# Patient Record
Sex: FEMALE | Race: WHITE | NOT HISPANIC OR LATINO | Employment: UNEMPLOYED | URBAN - METROPOLITAN AREA
[De-identification: names, ages, dates, MRNs, and addresses within clinical notes are randomized per-mention and may not be internally consistent; named-entity substitution may affect disease eponyms.]

---

## 2017-06-26 ENCOUNTER — OFFICE VISIT (OUTPATIENT)
Dept: FAMILY MEDICINE | Facility: CLINIC | Age: 37
End: 2017-06-26
Payer: COMMERCIAL

## 2017-06-26 VITALS
TEMPERATURE: 98 F | HEIGHT: 67 IN | WEIGHT: 154.75 LBS | SYSTOLIC BLOOD PRESSURE: 104 MMHG | OXYGEN SATURATION: 97 % | DIASTOLIC BLOOD PRESSURE: 68 MMHG | HEART RATE: 91 BPM | RESPIRATION RATE: 18 BRPM | BODY MASS INDEX: 24.29 KG/M2

## 2017-06-26 DIAGNOSIS — B86 SCABIES: Primary | ICD-10-CM

## 2017-06-26 PROCEDURE — 99999 PR PBB SHADOW E&M-EST. PATIENT-LVL III: CPT | Mod: PBBFAC,,, | Performed by: INTERNAL MEDICINE

## 2017-06-26 PROCEDURE — 99203 OFFICE O/P NEW LOW 30 MIN: CPT | Mod: S$GLB,,, | Performed by: INTERNAL MEDICINE

## 2017-06-26 RX ORDER — MULTIVITAMIN
1 TABLET ORAL DAILY
COMMUNITY
End: 2017-08-14

## 2017-06-26 RX ORDER — PERMETHRIN 50 MG/G
CREAM TOPICAL ONCE
Qty: 60 G | Refills: 1 | Status: SHIPPED | OUTPATIENT
Start: 2017-06-26 | End: 2017-06-26

## 2017-06-26 RX ORDER — DESOXIMETASONE 2.5 MG/G
1 CREAM TOPICAL 3 TIMES DAILY PRN
COMMUNITY
Start: 2017-05-04 | End: 2017-08-14

## 2017-06-26 NOTE — PROGRESS NOTES
Subjective:       Patient ID: Valerie Arizmendi is a 37 y.o. female.    Chief Complaint: Scabies (6 month)    Complains of moderately itchy rash on her b/l feet and hands for 6 mo.  Improved with premetherin cream, which she reapplied after 7 days, but then it came back.  No one else has this.  Her babies pediatrician dx and tx her for scabies.       Review of Systems   Constitutional: Negative for appetite change and fever.   HENT: Negative for nosebleeds and trouble swallowing.    Eyes: Negative for discharge and visual disturbance.   Respiratory: Negative for choking and shortness of breath.    Cardiovascular: Negative for chest pain and palpitations.   Gastrointestinal: Negative for abdominal pain, nausea and vomiting.   Musculoskeletal: Negative for arthralgias and joint swelling.   Skin: Positive for rash. Negative for wound.   Neurological: Negative for dizziness and syncope.   Psychiatric/Behavioral: Negative for confusion and dysphoric mood.       Objective:      Vitals:    06/26/17 0817   BP: 104/68   Pulse: 91   Resp: 18   Temp: 98.1 °F (36.7 °C)     Physical Exam   Constitutional: She appears well-nourished.   Eyes: Conjunctivae and EOM are normal.   Neck: Normal range of motion.   Pulmonary/Chest: Effort normal.   Musculoskeletal:   Normal ROM bilateral    Neurological: No cranial nerve deficit (grossly intact).   Skin: Skin is warm and dry.   peticial papular satelite lesions on b/l feet in hand concentrating in webs of toes, fingers, wrists, ankles    Psychiatric: She has a normal mood and affect.   Alert and orientated   Vitals reviewed.        Assessment:       1. Scabies        Plan:       Scabies  -     permethrin (ELIMITE) 5 % cream; Apply topically once. From neck down; repeat in 14 days  Dispense: 60 g; Refill: 1            Counseled on regular exercise, maintenance of a healthy weight, balanced diet rich in fruits/vegetables and lean protein, and avoidance of unhealthy habits like smoking and  "excessive alcohol intake.   Also, counseled on importance of being compliant with medication, health appointments, diet and exercise.     No Follow-up on file.    "This note will not be shared with the patient."  "

## 2017-08-14 ENCOUNTER — OFFICE VISIT (OUTPATIENT)
Dept: FAMILY MEDICINE | Facility: CLINIC | Age: 37
End: 2017-08-14
Payer: COMMERCIAL

## 2017-08-14 VITALS
WEIGHT: 157.19 LBS | HEART RATE: 86 BPM | SYSTOLIC BLOOD PRESSURE: 102 MMHG | BODY MASS INDEX: 24.67 KG/M2 | DIASTOLIC BLOOD PRESSURE: 71 MMHG | HEIGHT: 67 IN | OXYGEN SATURATION: 98 %

## 2017-08-14 DIAGNOSIS — B86 SCABIES: Primary | ICD-10-CM

## 2017-08-14 PROCEDURE — 99213 OFFICE O/P EST LOW 20 MIN: CPT | Mod: S$GLB,,, | Performed by: FAMILY MEDICINE

## 2017-08-14 PROCEDURE — 99999 PR PBB SHADOW E&M-EST. PATIENT-LVL III: CPT | Mod: PBBFAC,,, | Performed by: FAMILY MEDICINE

## 2017-08-14 PROCEDURE — 3008F BODY MASS INDEX DOCD: CPT | Mod: S$GLB,,, | Performed by: FAMILY MEDICINE

## 2017-08-14 RX ORDER — IVERMECTIN 5 MG/G
1 LOTION TOPICAL ONCE
Qty: 117 G | Refills: 2 | Status: SHIPPED | OUTPATIENT
Start: 2017-08-14 | End: 2017-08-14

## 2017-08-14 NOTE — PROGRESS NOTES
Subjective:       Patient ID: Valerie Arizmendi is a 37 y.o. female.    Chief Complaint: Scabies (F/U )    HPI     Recall that she has had 3 treatments of topical permethrin over the past 6 weeks for scabies.  Reports residual rash on bilat dorsal feet and ankles.  Reports mild itch at night.     No one else in the family has a rash.       Review of Systems      Review of Systems   Constitutional: Negative for fever and chills.   HENT: Negative for hearing loss and neck stiffness.    Eyes: Negative for redness and itching.   Respiratory: Negative for cough and choking.    Cardiovascular: Negative for chest pain and leg swelling.  Abdomen: Negative for abdominal pain and blood in stool.   Genitourinary: Negative for dysuria and flank pain.   Musculoskeletal: Negative for back pain and gait problem.   Neurological: Negative for light-headedness and headaches.   Hematological: Negative for adenopathy.   Psychiatric/Behavioral: Negative for behavioral problems.     Objective:      Physical Exam   Constitutional: She appears well-developed.   HENT:   Head: Normocephalic and atraumatic.   Eyes: Conjunctivae are normal. Pupils are equal, round, and reactive to light.   Neck: Normal range of motion.   Cardiovascular: Normal rate and regular rhythm.    No murmur heard.  Pulmonary/Chest: Effort normal and breath sounds normal. She has no wheezes.   Lymphadenopathy:     She has no cervical adenopathy.   Skin:   bilat dorsal feet and ankles: papules noted.        Assessment:       1. Scabies        Plan:       Scabies    Other orders  -     ivermectin 0.5 % Lotn; Apply 1 application topically once.  Dispense: 117 g; Refill: 2      Plan:  Start topical ivermectin

## 2017-08-15 ENCOUNTER — TELEPHONE (OUTPATIENT)
Dept: FAMILY MEDICINE | Facility: CLINIC | Age: 37
End: 2017-08-15

## 2017-08-15 NOTE — TELEPHONE ENCOUNTER
----- Message from Minnie Boothe sent at 8/15/2017  3:43 PM CDT -----  Contact: Valerie  Patient is calling as picked up Rx today that was prescribed yesterday and needs to know how to use it properly. Please call [phone]. Thanks!

## 2018-09-24 ENCOUNTER — PATIENT OUTREACH (OUTPATIENT)
Dept: ADMINISTRATIVE | Facility: HOSPITAL | Age: 38
End: 2018-09-24

## 2019-10-18 ENCOUNTER — OFFICE VISIT (OUTPATIENT)
Dept: FAMILY MEDICINE | Facility: CLINIC | Age: 39
End: 2019-10-18
Payer: COMMERCIAL

## 2019-10-18 VITALS
HEIGHT: 67 IN | BODY MASS INDEX: 22.96 KG/M2 | WEIGHT: 146.25 LBS | SYSTOLIC BLOOD PRESSURE: 118 MMHG | DIASTOLIC BLOOD PRESSURE: 80 MMHG | HEART RATE: 100 BPM | TEMPERATURE: 98 F | OXYGEN SATURATION: 98 %

## 2019-10-18 DIAGNOSIS — R59.0 AXILLARY ADENOPATHY: ICD-10-CM

## 2019-10-18 DIAGNOSIS — M75.21 BICEPS TENDINITIS OF RIGHT UPPER EXTREMITY: ICD-10-CM

## 2019-10-18 DIAGNOSIS — N63.0 BREAST NODULE: ICD-10-CM

## 2019-10-18 DIAGNOSIS — S43.401A SPRAIN OF RIGHT SHOULDER, UNSPECIFIED SHOULDER SPRAIN TYPE, INITIAL ENCOUNTER: Primary | ICD-10-CM

## 2019-10-18 PROCEDURE — 99999 PR PBB SHADOW E&M-EST. PATIENT-LVL IV: ICD-10-PCS | Mod: PBBFAC,,, | Performed by: NURSE PRACTITIONER

## 2019-10-18 PROCEDURE — 3008F PR BODY MASS INDEX (BMI) DOCUMENTED: ICD-10-PCS | Mod: CPTII,S$GLB,, | Performed by: NURSE PRACTITIONER

## 2019-10-18 PROCEDURE — 99214 OFFICE O/P EST MOD 30 MIN: CPT | Mod: S$GLB,,, | Performed by: NURSE PRACTITIONER

## 2019-10-18 PROCEDURE — 99999 PR PBB SHADOW E&M-EST. PATIENT-LVL IV: CPT | Mod: PBBFAC,,, | Performed by: NURSE PRACTITIONER

## 2019-10-18 PROCEDURE — 99214 PR OFFICE/OUTPT VISIT, EST, LEVL IV, 30-39 MIN: ICD-10-PCS | Mod: S$GLB,,, | Performed by: NURSE PRACTITIONER

## 2019-10-18 PROCEDURE — 3008F BODY MASS INDEX DOCD: CPT | Mod: CPTII,S$GLB,, | Performed by: NURSE PRACTITIONER

## 2019-10-18 RX ORDER — NAPROXEN 500 MG/1
500 TABLET ORAL 2 TIMES DAILY WITH MEALS
Qty: 28 TABLET | Refills: 0 | Status: SHIPPED | OUTPATIENT
Start: 2019-10-18 | End: 2019-11-01

## 2019-10-18 NOTE — PROGRESS NOTES
This dictation has been generated using Modal Fluency Dictation some phonetic errors may occur. Please contact author for clarification if needed.     Problem List Items Addressed This Visit     None      Visit Diagnoses     Sprain of right shoulder, unspecified shoulder sprain type, initial encounter    -  Primary    Relevant Orders    Ambulatory Referral to Physical/Occupational Therapy    Axillary adenopathy        Relevant Orders    Mammo Digital Diagnostic Right With CAD    US Breast Right Complete    Breast nodule        Relevant Orders    Mammo Digital Diagnostic Right With CAD    US Breast Right Complete    Biceps tendinitis of right upper extremity        Relevant Orders    Ambulatory Referral to Physical/Occupational Therapy        Orders Placed This Encounter    Mammo Digital Diagnostic Right With CAD    US Breast Right Complete    Ambulatory Referral to Physical/Occupational Therapy    naproxen (NAPROSYN) 500 MG tablet     Sprain of right shoulder involving supraspinatus infraspinatus and pectoralis major muscle.  Also with biceps tendinitis.  Refer to physical therapy.  Strength is intact. Positive can tilt.  Breast nodule.  Diagnostic mammogram and ultrasound as above.  I will review results and address accordingly.    Follow up in about 1 month (around 11/18/2019).    ________________________________________________________________  ________________________________________________________________      Chief Complaint   Patient presents with    Arm Pain     Right shoulder and under the armpit. Patient stated she felt a small lump in her armpit. Ocurring x2 weeks      History of present illness  This 39 y.o. presents today for complaint of right shoulder pain and nodule.  Patient denies a specific history of injury.  She notes that she fell a few weeks ago and hurt her left hip.  She did not hurt her shoulder at that time.  Later in visit remember that she did tried to catch herself with her right  arm on the cabinet.  Despite remember this did not remainder any particular shoulder pain. Has had worsening right shoulder pain over the last 2 weeks.  No for the left hip pain. She did try a couple of Aleve a couple of times a day intermittently for a few days.  Patient also notes pain relief with marijuana smoking.  She does have a 3-1/2-year-old and notes carrying him around.  This exacerbates the shoulder pain. She notes sleeping on it and having shoulder pain at onset.  Patient notes a nodule above the right breast.  She thinks it might be a lymph node but unsure.  Describes it is a flat being.  She had been rubbing on it enough checking it too bruise the area but that has resolved.  Her aunt had breast cancer.  Her mother and grandmother have not had breast cancer.  Patient denies any drainage from the breast.  No breast pain. She did breast feed her child.  Has not had exam since delivery of child 3 and half years ago.  Review of systems  No fever or chills  No chest pain or shortness of breath.  No hemoptysis.  Patient denies rash  No neck pain. No cervical radiculopathy symptoms.    Past medical and social history reviewed.  Patient new to me.  Follows with in the system    Past Medical History:   Diagnosis Date    Abnormal Pap smear of vagina     colpo bx- cervixitis    Attention deficit disorder        Past Surgical History:   Procedure Laterality Date     SECTION         Family History   Problem Relation Age of Onset    Breast cancer Neg Hx     Ovarian cancer Neg Hx        Social History     Socioeconomic History    Marital status:      Spouse name: Not on file    Number of children: Not on file    Years of education: Not on file    Highest education level: Not on file   Occupational History    Not on file   Social Needs    Financial resource strain: Not on file    Food insecurity:     Worry: Not on file     Inability: Not on file    Transportation needs:     Medical: Not on  file     Non-medical: Not on file   Tobacco Use    Smoking status: Former Smoker    Smokeless tobacco: Never Used   Substance and Sexual Activity    Alcohol use: Yes    Drug use: Yes     Types: Marijuana    Sexual activity: Yes     Partners: Male   Lifestyle    Physical activity:     Days per week: Not on file     Minutes per session: Not on file    Stress: Not on file   Relationships    Social connections:     Talks on phone: Not on file     Gets together: Not on file     Attends Sabianist service: Not on file     Active member of club or organization: Not on file     Attends meetings of clubs or organizations: Not on file     Relationship status: Not on file   Other Topics Concern    Not on file   Social History Narrative    Not on file       Current Outpatient Medications   Medication Sig Dispense Refill    naproxen (NAPROSYN) 500 MG tablet Take 1 tablet (500 mg total) by mouth 2 (two) times daily with meals. for 14 days 28 tablet 0     No current facility-administered medications for this visit.        Review of patient's allergies indicates:  No Known Allergies    Physical examination  Vitals Reviewed  Gen. Well-dressed well-nourished   Skin warm dry and intact.  No rashes noted.  Neck is supple without adenopathy  Chest.  Respirations are even unlabored.  Lungs are clear to auscultation.  Cardiac regular rate and rhythm.  No chest wall adenopathy noted.  Breast exam is Dedra lewis.  Normal breast exam with nodular tissue.  Does have nodule in the tail of the right breast.  She notes tenderness with palpation.  No erythematous changes noted. No axillary adenopathy noted.  Neuro. Awake alert oriented x4.  Normal judgment and cognition noted.  Extremities no clubbing cyanosis or edema noted. Right shoulder pain full range of motion but with stated pain.  Patient does have pain but strength intact to resistance internal external abduction adduction and extension.  Cross shoulder reach negative.   She does have some tenderness over the acromioclavicular but joint.  She also has some tenderness over the biceps tendon.  Some swelling of the pectoralis major muscle noted and tenderness to palpation.    Call or return to clinic prn if these symptoms worsen or fail to improve as anticipated.

## 2019-12-04 ENCOUNTER — HOSPITAL ENCOUNTER (OUTPATIENT)
Dept: RADIOLOGY | Facility: HOSPITAL | Age: 39
Discharge: HOME | End: 2019-12-04
Attending: NURSE PRACTITIONER
Payer: COMMERCIAL

## 2019-12-04 DIAGNOSIS — R59.0 AXILLARY ADENOPATHY: ICD-10-CM

## 2019-12-04 DIAGNOSIS — N63.0 BREAST NODULE: ICD-10-CM

## 2019-12-04 PROCEDURE — 77066 DX MAMMO INCL CAD BI: CPT | Mod: 26,,, | Performed by: RADIOLOGY

## 2019-12-04 PROCEDURE — 76642 ULTRASOUND BREAST LIMITED: CPT | Mod: 26,LT,, | Performed by: RADIOLOGY

## 2019-12-04 PROCEDURE — 76642 ULTRASOUND BREAST LIMITED: CPT | Mod: TC,50,PO

## 2019-12-04 PROCEDURE — 77062 BREAST TOMOSYNTHESIS BI: CPT | Mod: 26,,, | Performed by: RADIOLOGY

## 2019-12-04 PROCEDURE — 76642 ULTRASOUND BREAST LIMITED: CPT | Mod: 26,RT,, | Performed by: RADIOLOGY

## 2019-12-04 PROCEDURE — 77066 DX MAMMO INCL CAD BI: CPT | Mod: TC,PO

## 2019-12-04 PROCEDURE — 77062 MAMMO DIGITAL DIAGNOSTIC BILAT WITH TOMOSYNTHESIS_CAD: ICD-10-PCS | Mod: 26,,, | Performed by: RADIOLOGY

## 2019-12-04 PROCEDURE — 77066 MAMMO DIGITAL DIAGNOSTIC BILAT WITH TOMOSYNTHESIS_CAD: ICD-10-PCS | Mod: 26,,, | Performed by: RADIOLOGY

## 2019-12-04 PROCEDURE — 76642 PR US BREAST UNILAT LIMITED: ICD-10-PCS | Mod: 26,LT,, | Performed by: RADIOLOGY

## 2019-12-09 ENCOUNTER — PATIENT MESSAGE (OUTPATIENT)
Dept: FAMILY MEDICINE | Facility: CLINIC | Age: 39
End: 2019-12-09

## 2021-03-02 ENCOUNTER — TELEPHONE (OUTPATIENT)
Dept: NEUROLOGY | Facility: CLINIC | Age: 41
End: 2021-03-02

## 2021-03-02 ENCOUNTER — PATIENT MESSAGE (OUTPATIENT)
Dept: NEUROLOGY | Facility: CLINIC | Age: 41
End: 2021-03-02

## 2021-03-09 ENCOUNTER — OFFICE VISIT (OUTPATIENT)
Dept: NEUROLOGY | Facility: CLINIC | Age: 41
End: 2021-03-09
Payer: COMMERCIAL

## 2021-03-09 VITALS
BODY MASS INDEX: 24.4 KG/M2 | SYSTOLIC BLOOD PRESSURE: 108 MMHG | DIASTOLIC BLOOD PRESSURE: 73 MMHG | HEIGHT: 67 IN | HEART RATE: 76 BPM | TEMPERATURE: 98 F | RESPIRATION RATE: 16 BRPM | WEIGHT: 155.44 LBS

## 2021-03-09 DIAGNOSIS — R56.9 CONVULSIONS, UNSPECIFIED CONVULSION TYPE: ICD-10-CM

## 2021-03-09 DIAGNOSIS — R06.83 SNORING: Primary | ICD-10-CM

## 2021-03-09 PROCEDURE — 99205 OFFICE O/P NEW HI 60 MIN: CPT | Mod: S$GLB,,, | Performed by: PSYCHIATRY & NEUROLOGY

## 2021-03-09 PROCEDURE — 99999 PR PBB SHADOW E&M-EST. PATIENT-LVL IV: CPT | Mod: PBBFAC,,, | Performed by: PSYCHIATRY & NEUROLOGY

## 2021-03-09 PROCEDURE — 99999 PR PBB SHADOW E&M-EST. PATIENT-LVL IV: ICD-10-PCS | Mod: PBBFAC,,, | Performed by: PSYCHIATRY & NEUROLOGY

## 2021-03-09 PROCEDURE — 1126F AMNT PAIN NOTED NONE PRSNT: CPT | Mod: S$GLB,,, | Performed by: PSYCHIATRY & NEUROLOGY

## 2021-03-09 PROCEDURE — 3008F BODY MASS INDEX DOCD: CPT | Mod: CPTII,S$GLB,, | Performed by: PSYCHIATRY & NEUROLOGY

## 2021-03-09 PROCEDURE — 1126F PR PAIN SEVERITY QUANTIFIED, NO PAIN PRESENT: ICD-10-PCS | Mod: S$GLB,,, | Performed by: PSYCHIATRY & NEUROLOGY

## 2021-03-09 PROCEDURE — 99205 PR OFFICE/OUTPT VISIT, NEW, LEVL V, 60-74 MIN: ICD-10-PCS | Mod: S$GLB,,, | Performed by: PSYCHIATRY & NEUROLOGY

## 2021-03-09 PROCEDURE — 3008F PR BODY MASS INDEX (BMI) DOCUMENTED: ICD-10-PCS | Mod: CPTII,S$GLB,, | Performed by: PSYCHIATRY & NEUROLOGY

## 2021-03-18 ENCOUNTER — TELEPHONE (OUTPATIENT)
Dept: NEUROLOGY | Facility: CLINIC | Age: 41
End: 2021-03-18

## 2021-04-07 ENCOUNTER — HOSPITAL ENCOUNTER (OUTPATIENT)
Dept: RADIOLOGY | Facility: HOSPITAL | Age: 41
Discharge: HOME | End: 2021-04-07
Attending: PSYCHIATRY & NEUROLOGY
Payer: COMMERCIAL

## 2021-04-07 DIAGNOSIS — R56.9 CONVULSIONS, UNSPECIFIED CONVULSION TYPE: ICD-10-CM

## 2021-04-07 PROCEDURE — 70551 MRI BRAIN STEM W/O DYE: CPT | Mod: TC,PO

## 2021-04-07 PROCEDURE — 70551 MRI BRAIN STEM W/O DYE: CPT | Mod: 26,,, | Performed by: RADIOLOGY

## 2021-04-07 PROCEDURE — 70551 MRI BRAIN EPILEPSY WITHOUT CONTRAST: ICD-10-PCS | Mod: 26,,, | Performed by: RADIOLOGY

## 2022-06-28 ENCOUNTER — HOSPITAL ENCOUNTER (OUTPATIENT)
Dept: RADIOLOGY | Facility: OTHER | Age: 42
Discharge: HOME | End: 2022-06-28
Attending: INTERNAL MEDICINE
Payer: COMMERCIAL

## 2022-06-28 ENCOUNTER — OFFICE VISIT (OUTPATIENT)
Dept: PRIMARY CARE CLINIC | Facility: CLINIC | Age: 42
End: 2022-06-28
Payer: COMMERCIAL

## 2022-06-28 VITALS
TEMPERATURE: 98 F | OXYGEN SATURATION: 99 % | BODY MASS INDEX: 25.06 KG/M2 | HEART RATE: 79 BPM | WEIGHT: 159.63 LBS | DIASTOLIC BLOOD PRESSURE: 66 MMHG | RESPIRATION RATE: 18 BRPM | SYSTOLIC BLOOD PRESSURE: 108 MMHG | HEIGHT: 67 IN

## 2022-06-28 DIAGNOSIS — Z12.31 SCREENING MAMMOGRAM FOR BREAST CANCER: Primary | ICD-10-CM

## 2022-06-28 DIAGNOSIS — K64.9 HEMORRHOIDS, UNSPECIFIED HEMORRHOID TYPE: ICD-10-CM

## 2022-06-28 DIAGNOSIS — Z00.00 WELL ADULT EXAM: ICD-10-CM

## 2022-06-28 DIAGNOSIS — R92.8 FOLLOW-UP EXAMINATION OF ABNORMAL MAMMOGRAM: ICD-10-CM

## 2022-06-28 DIAGNOSIS — Z12.31 SCREENING MAMMOGRAM FOR BREAST CANCER: ICD-10-CM

## 2022-06-28 PROCEDURE — 3078F PR MOST RECENT DIASTOLIC BLOOD PRESSURE < 80 MM HG: ICD-10-PCS | Mod: CPTII,S$GLB,, | Performed by: INTERNAL MEDICINE

## 2022-06-28 PROCEDURE — 3078F DIAST BP <80 MM HG: CPT | Mod: CPTII,S$GLB,, | Performed by: INTERNAL MEDICINE

## 2022-06-28 PROCEDURE — 3008F PR BODY MASS INDEX (BMI) DOCUMENTED: ICD-10-PCS | Mod: CPTII,S$GLB,, | Performed by: INTERNAL MEDICINE

## 2022-06-28 PROCEDURE — 3074F PR MOST RECENT SYSTOLIC BLOOD PRESSURE < 130 MM HG: ICD-10-PCS | Mod: CPTII,S$GLB,, | Performed by: INTERNAL MEDICINE

## 2022-06-28 PROCEDURE — 77063 BREAST TOMOSYNTHESIS BI: CPT | Mod: TC

## 2022-06-28 PROCEDURE — 3074F SYST BP LT 130 MM HG: CPT | Mod: CPTII,S$GLB,, | Performed by: INTERNAL MEDICINE

## 2022-06-28 PROCEDURE — 3008F BODY MASS INDEX DOCD: CPT | Mod: CPTII,S$GLB,, | Performed by: INTERNAL MEDICINE

## 2022-06-28 PROCEDURE — 99396 PR PREVENTIVE VISIT,EST,40-64: ICD-10-PCS | Mod: S$GLB,,, | Performed by: INTERNAL MEDICINE

## 2022-06-28 PROCEDURE — 77067 SCR MAMMO BI INCL CAD: CPT | Mod: TC

## 2022-06-28 PROCEDURE — 1160F RVW MEDS BY RX/DR IN RCRD: CPT | Mod: CPTII,S$GLB,, | Performed by: INTERNAL MEDICINE

## 2022-06-28 PROCEDURE — 1160F PR REVIEW ALL MEDS BY PRESCRIBER/CLIN PHARMACIST DOCUMENTED: ICD-10-PCS | Mod: CPTII,S$GLB,, | Performed by: INTERNAL MEDICINE

## 2022-06-28 PROCEDURE — 1159F MED LIST DOCD IN RCRD: CPT | Mod: CPTII,S$GLB,, | Performed by: INTERNAL MEDICINE

## 2022-06-28 PROCEDURE — 77063 MAMMO DIGITAL SCREENING BILAT WITH TOMO: ICD-10-PCS | Mod: 26,,, | Performed by: RADIOLOGY

## 2022-06-28 PROCEDURE — 77067 MAMMO DIGITAL SCREENING BILAT WITH TOMO: ICD-10-PCS | Mod: 26,,, | Performed by: RADIOLOGY

## 2022-06-28 PROCEDURE — 99999 PR PBB SHADOW E&M-EST. PATIENT-LVL V: ICD-10-PCS | Mod: PBBFAC,,, | Performed by: INTERNAL MEDICINE

## 2022-06-28 PROCEDURE — 77063 BREAST TOMOSYNTHESIS BI: CPT | Mod: 26,,, | Performed by: RADIOLOGY

## 2022-06-28 PROCEDURE — 99999 PR PBB SHADOW E&M-EST. PATIENT-LVL V: CPT | Mod: PBBFAC,,, | Performed by: INTERNAL MEDICINE

## 2022-06-28 PROCEDURE — 1159F PR MEDICATION LIST DOCUMENTED IN MEDICAL RECORD: ICD-10-PCS | Mod: CPTII,S$GLB,, | Performed by: INTERNAL MEDICINE

## 2022-06-28 PROCEDURE — 77067 SCR MAMMO BI INCL CAD: CPT | Mod: 26,,, | Performed by: RADIOLOGY

## 2022-06-28 PROCEDURE — 99396 PREV VISIT EST AGE 40-64: CPT | Mod: S$GLB,,, | Performed by: INTERNAL MEDICINE

## 2022-06-28 NOTE — PROGRESS NOTES
[facility] Progress Note  2022          Reason for Visit:      had concerns including Establish Care, Annual Exam, and Mammogram.       History of Present Illness:     Pt is here today to establish primary care.  She does have a couple of concerns.    She had a MMG/US about 2 years ago and it showed some abnormalities and a repeat US was recommended in 6 months.  She says when covid hit, she never followed upon that.  She has not felt anything abnormal herself.  She is interested in getting these tests.    She has not recently seen gyn and is agreeable for referral.  She thinks her last appt was in 2018.  The last pap smear we can see is from .  She says she had previous colposcopy for an abnormal pap.    Pt has had some hemorrhoids since her pregnancy.  They do not itch or bleed, but she noticed them in the bath and wants to make sure that is all they are and that they are not something to be concerned about.      Problem List:     Patient Active Problem List   Diagnosis    Attention deficit disorder         Surgical History:     She has a past surgical history that includes  section.      Family History:      Her family history includes Breast cancer in her maternal aunt and paternal grandmother; No Known Problems in her father and mother.      Social History:     She reports that she has quit smoking. She has never used smokeless tobacco. She reports current alcohol use. She reports current drug use. Drug: Marijuana.      Medications:     No current outpatient medications on file.        Allergies:   She has No Known Allergies.      Review of Systems:     Review of Systems   Constitutional: Negative for chills and fever.   HENT: Negative for ear pain and sore throat.    Respiratory: Negative for cough, shortness of breath and wheezing.    Cardiovascular: Negative for chest pain and palpitations.   Gastrointestinal: Negative for constipation, diarrhea, nausea and vomiting.  "  Genitourinary: Negative for dysuria and hematuria.   Musculoskeletal: Negative for joint swelling and joint deformity.   Neurological: Negative for dizziness and weakness.           Physical Exam:     Temp:    98 °F (36.7 °C) (Oral)        Blood Pressure:  108/66        Pulse:   79     Respirations:  18  Weight:   72.4 kg (159 lb 9.8 oz)  Height:   5' 7" (1.702 m)  BMI:     Body mass index is 25 kg/m².    Physical Exam  Constitutional:       General: She is not in acute distress.  HENT:      Right Ear: Tympanic membrane normal.      Left Ear: Tympanic membrane normal.      Nose: No congestion or rhinorrhea.      Mouth/Throat:      Pharynx: No oropharyngeal exudate or posterior oropharyngeal erythema.   Cardiovascular:      Rate and Rhythm: Normal rate and regular rhythm.   Pulmonary:      Effort: Pulmonary effort is normal. No respiratory distress.      Breath sounds: No wheezing.   Abdominal:      Palpations: Abdomen is soft.      Tenderness: There is no abdominal tenderness.   Genitourinary:     Comments: Non-thrombosed external hemorrhoid at 12 oclock.  Non thrombosed internal hermorrhoid at 3 oclock.    Skin:     General: Skin is warm.   Neurological:      General: No focal deficit present.      Mental Status: She is alert and oriented to person, place, and time.           Labs/Imaging/Testing:     Most recent CBC:  Lab Results   Component Value Date    WBC 9.60 03/02/2021    HGB 12.8 03/02/2021     03/02/2021    MCV 89 03/02/2021       Most recent Lipids:  No results found for: CHOL, HDL, LDLCALC, TRIG    Most recent Chemistry:  Lab Results   Component Value Date     03/02/2021    K 3.9 03/02/2021     03/02/2021    CO2 24 03/02/2021    BUN 13 03/02/2021    CREATININE 0.75 03/02/2021     (H) 03/02/2021    CALCIUM 9.4 03/02/2021    ALT 14 03/02/2021    AST 27 03/02/2021    ALKPHOS 47 03/02/2021    PROT 7.5 03/02/2021    ALBUMIN 4.7 03/02/2021       Other tests:  Lab Results   Component " Value Date    TSH 0.923 08/12/2015    MG 2.2 03/02/2021           Assessment and Plan:     1. Well adult exam  Reviewed HM.  Pt declined screening labs today  Referring to gyn for pap  Ordered MMG/US to follow up on previous mmg from 2019  We presume she got a tdap during her pregnancy back in 2016 -     2. Screening mammogram for breast cancer  - Mammo Digital Screening Bilat w/ Andrea; Future  - Ambulatory referral/consult to Obstetrics / Gynecology; Future  - US Breast Bilateral Limited; Future    3. Hemorrhoids, unspecified hemorrhoid type  Not having symptoms at this time.  Discused otc topical treatments.  Follow up if they become problematic    4. Follow-up examination of abnormal mammogram  - US Breast Bilateral Limited; Future     Referred to Gyn for routine gyn exam, pap  RTC 12 months or sooner venessa Swartz MD  6/28/2022    This note was prepared using voice-recognition software.  Although efforts are made to proofread the note, some errors may persist in the final document.

## 2022-07-14 ENCOUNTER — PATIENT MESSAGE (OUTPATIENT)
Dept: ADMINISTRATIVE | Facility: HOSPITAL | Age: 42
End: 2022-07-14
Payer: COMMERCIAL

## 2022-08-11 ENCOUNTER — HOSPITAL ENCOUNTER (EMERGENCY)
Facility: HOSPITAL | Age: 42
Discharge: HOME | End: 2022-08-11
Attending: EMERGENCY MEDICINE
Payer: COMMERCIAL

## 2022-08-11 VITALS
RESPIRATION RATE: 16 BRPM | DIASTOLIC BLOOD PRESSURE: 67 MMHG | OXYGEN SATURATION: 100 % | TEMPERATURE: 98 F | HEART RATE: 71 BPM | SYSTOLIC BLOOD PRESSURE: 105 MMHG

## 2022-08-11 DIAGNOSIS — R09.02 HYPOXIA: ICD-10-CM

## 2022-08-11 DIAGNOSIS — R56.9 SEIZURE: Primary | ICD-10-CM

## 2022-08-11 LAB
ALBUMIN SERPL BCP-MCNC: 4.6 G/DL (ref 3.5–5.2)
ALP SERPL-CCNC: 63 U/L (ref 55–135)
ALT SERPL W/O P-5'-P-CCNC: 19 U/L (ref 10–44)
AMPHET+METHAMPHET UR QL: NEGATIVE
ANION GAP SERPL CALC-SCNC: 23 MMOL/L (ref 8–16)
AST SERPL-CCNC: 22 U/L (ref 10–40)
B-HCG UR QL: NEGATIVE
BARBITURATES UR QL SCN>200 NG/ML: NEGATIVE
BASOPHILS # BLD AUTO: 0.06 K/UL (ref 0–0.2)
BASOPHILS NFR BLD: 0.3 % (ref 0–1.9)
BENZODIAZ UR QL SCN>200 NG/ML: NEGATIVE
BILIRUB SERPL-MCNC: 0.4 MG/DL (ref 0.1–1)
BILIRUB UR QL STRIP: NEGATIVE
BUN SERPL-MCNC: 9 MG/DL (ref 6–20)
BZE UR QL SCN: NEGATIVE
CALCIUM SERPL-MCNC: 9.9 MG/DL (ref 8.7–10.5)
CANNABINOIDS UR QL SCN: ABNORMAL
CHLORIDE SERPL-SCNC: 103 MMOL/L (ref 95–110)
CLARITY UR REFRACT.AUTO: CLEAR
CO2 SERPL-SCNC: 13 MMOL/L (ref 23–29)
COLOR UR AUTO: COLORLESS
CREAT SERPL-MCNC: 0.9 MG/DL (ref 0.5–1.4)
CREAT UR-MCNC: 21 MG/DL (ref 15–325)
CTP QC/QA: YES
DIFFERENTIAL METHOD: ABNORMAL
EOSINOPHIL # BLD AUTO: 0 K/UL (ref 0–0.5)
EOSINOPHIL NFR BLD: 0.2 % (ref 0–8)
ERYTHROCYTE [DISTWIDTH] IN BLOOD BY AUTOMATED COUNT: 12.1 % (ref 11.5–14.5)
EST. GFR  (NO RACE VARIABLE): >60 ML/MIN/1.73 M^2
GLUCOSE SERPL-MCNC: 147 MG/DL (ref 70–110)
GLUCOSE SERPL-MCNC: 148 MG/DL (ref 70–110)
GLUCOSE UR QL STRIP: NEGATIVE
HCT VFR BLD AUTO: 41.7 % (ref 37–48.5)
HGB BLD-MCNC: 13.6 G/DL (ref 12–16)
HGB UR QL STRIP: ABNORMAL
IMM GRANULOCYTES # BLD AUTO: 0.11 K/UL (ref 0–0.04)
IMM GRANULOCYTES NFR BLD AUTO: 0.5 % (ref 0–0.5)
KETONES UR QL STRIP: ABNORMAL
LEUKOCYTE ESTERASE UR QL STRIP: NEGATIVE
LYMPHOCYTES # BLD AUTO: 3.9 K/UL (ref 1–4.8)
LYMPHOCYTES NFR BLD: 19 % (ref 18–48)
MAGNESIUM SERPL-MCNC: 2.2 MG/DL (ref 1.6–2.6)
MCH RBC QN AUTO: 29.5 PG (ref 27–31)
MCHC RBC AUTO-ENTMCNC: 32.6 G/DL (ref 32–36)
MCV RBC AUTO: 91 FL (ref 82–98)
METHADONE UR QL SCN>300 NG/ML: NEGATIVE
MONOCYTES # BLD AUTO: 0.6 K/UL (ref 0.3–1)
MONOCYTES NFR BLD: 3.1 % (ref 4–15)
NEUTROPHILS # BLD AUTO: 15.6 K/UL (ref 1.8–7.7)
NEUTROPHILS NFR BLD: 76.9 % (ref 38–73)
NITRITE UR QL STRIP: NEGATIVE
NRBC BLD-RTO: 0 /100 WBC
OPIATES UR QL SCN: NEGATIVE
PCP UR QL SCN>25 NG/ML: NEGATIVE
PH UR STRIP: 5 [PH] (ref 5–8)
PLATELET # BLD AUTO: 365 K/UL (ref 150–450)
PMV BLD AUTO: 10.9 FL (ref 9.2–12.9)
POTASSIUM SERPL-SCNC: 3.3 MMOL/L (ref 3.5–5.1)
PROT SERPL-MCNC: 8.4 G/DL (ref 6–8.4)
PROT UR QL STRIP: NEGATIVE
RBC # BLD AUTO: 4.61 M/UL (ref 4–5.4)
SARS-COV-2 RDRP RESP QL NAA+PROBE: NEGATIVE
SODIUM SERPL-SCNC: 139 MMOL/L (ref 136–145)
SP GR UR STRIP: 1 (ref 1–1.03)
TOXICOLOGY INFORMATION: ABNORMAL
TSH SERPL DL<=0.005 MIU/L-ACNC: 2 UIU/ML (ref 0.4–4)
URN SPEC COLLECT METH UR: ABNORMAL
WBC # BLD AUTO: 20.37 K/UL (ref 3.9–12.7)

## 2022-08-11 PROCEDURE — 99285 EMERGENCY DEPT VISIT HI MDM: CPT | Mod: CS,,, | Performed by: EMERGENCY MEDICINE

## 2022-08-11 PROCEDURE — 96374 THER/PROPH/DIAG INJ IV PUSH: CPT | Mod: 59

## 2022-08-11 PROCEDURE — 84443 ASSAY THYROID STIM HORMONE: CPT | Performed by: EMERGENCY MEDICINE

## 2022-08-11 PROCEDURE — 81025 URINE PREGNANCY TEST: CPT | Performed by: EMERGENCY MEDICINE

## 2022-08-11 PROCEDURE — 83735 ASSAY OF MAGNESIUM: CPT | Performed by: EMERGENCY MEDICINE

## 2022-08-11 PROCEDURE — 93010 EKG 12-LEAD: ICD-10-PCS | Mod: ,,, | Performed by: INTERNAL MEDICINE

## 2022-08-11 PROCEDURE — 93010 ELECTROCARDIOGRAM REPORT: CPT | Mod: ,,, | Performed by: INTERNAL MEDICINE

## 2022-08-11 PROCEDURE — U0002 COVID-19 LAB TEST NON-CDC: HCPCS | Performed by: EMERGENCY MEDICINE

## 2022-08-11 PROCEDURE — 63600175 PHARM REV CODE 636 W HCPCS: Performed by: EMERGENCY MEDICINE

## 2022-08-11 PROCEDURE — 80053 COMPREHEN METABOLIC PANEL: CPT | Performed by: EMERGENCY MEDICINE

## 2022-08-11 PROCEDURE — 80307 DRUG TEST PRSMV CHEM ANLYZR: CPT | Performed by: EMERGENCY MEDICINE

## 2022-08-11 PROCEDURE — 99285 PR EMERGENCY DEPT VISIT,LEVEL V: ICD-10-PCS | Mod: CS,,, | Performed by: EMERGENCY MEDICINE

## 2022-08-11 PROCEDURE — 81003 URINALYSIS AUTO W/O SCOPE: CPT | Mod: 59 | Performed by: EMERGENCY MEDICINE

## 2022-08-11 PROCEDURE — 25000003 PHARM REV CODE 250: Performed by: EMERGENCY MEDICINE

## 2022-08-11 PROCEDURE — 99284 EMERGENCY DEPT VISIT MOD MDM: CPT | Mod: 25

## 2022-08-11 PROCEDURE — 85025 COMPLETE CBC W/AUTO DIFF WBC: CPT | Performed by: EMERGENCY MEDICINE

## 2022-08-11 PROCEDURE — 93005 ELECTROCARDIOGRAM TRACING: CPT

## 2022-08-11 RX ORDER — LEVETIRACETAM 500 MG/1
500 TABLET ORAL 2 TIMES DAILY
Qty: 60 TABLET | Refills: 2 | Status: SHIPPED | OUTPATIENT
Start: 2022-08-11 | End: 2022-11-04 | Stop reason: SDUPTHER

## 2022-08-11 RX ORDER — LEVETIRACETAM 500 MG/5ML
2000 INJECTION, SOLUTION, CONCENTRATE INTRAVENOUS
Status: COMPLETED | OUTPATIENT
Start: 2022-08-11 | End: 2022-08-11

## 2022-08-11 RX ORDER — MIDAZOLAM HYDROCHLORIDE 1 MG/ML
INJECTION INTRAMUSCULAR; INTRAVENOUS
Status: DISCONTINUED
Start: 2022-08-11 | End: 2022-08-11 | Stop reason: HOSPADM

## 2022-08-11 RX ORDER — MIDAZOLAM HYDROCHLORIDE 1 MG/ML
4 INJECTION INTRAMUSCULAR; INTRAVENOUS
Status: DISCONTINUED | OUTPATIENT
Start: 2022-08-11 | End: 2022-08-11 | Stop reason: HOSPADM

## 2022-08-11 RX ADMIN — SODIUM CHLORIDE 1000 ML: 0.9 INJECTION, SOLUTION INTRAVENOUS at 01:08

## 2022-08-11 RX ADMIN — LEVETIRACETAM 2000 MG: 100 INJECTION, SOLUTION INTRAVENOUS at 12:08

## 2022-08-11 NOTE — DISCHARGE INSTRUCTIONS
Take the prescribed seizure medication as directed.  Follow up with Neurology.  As previously discussed during her last ER visit when he had a seizure, you cannot drive, operate heavy machinery or a motor vehicle, stand on a ladder, swim, or perform any other high risk activity until cleared to do so by her neurologist.    Tests today showed:   Labs Reviewed   CBC W/ AUTO DIFFERENTIAL - Abnormal; Notable for the following components:       Result Value    WBC 20.37 (*)     Gran # (ANC) 15.6 (*)     Immature Grans (Abs) 0.11 (*)     Gran % 76.9 (*)     Mono % 3.1 (*)     All other components within normal limits   COMPREHENSIVE METABOLIC PANEL - Abnormal; Notable for the following components:    Potassium 3.3 (*)     CO2 13 (*)     Glucose 147 (*)     Anion Gap 23 (*)     All other components within normal limits   URINALYSIS, REFLEX TO URINE CULTURE - Abnormal; Notable for the following components:    Color, UA Colorless (*)     Ketones, UA Trace (*)     Occult Blood UA Trace (*)     All other components within normal limits    Narrative:     Specimen Source->Urine   DRUG SCREEN PANEL, URINE EMERGENCY - Abnormal; Notable for the following components:    THC Presumptive Positive (*)     All other components within normal limits    Narrative:     Specimen Source->Urine   POCT GLUCOSE MONITORING CONTINUOUS - Abnormal; Notable for the following components:    POC Glucose 148 (*)     All other components within normal limits   MAGNESIUM   TSH   SARS-COV-2 RNA AMPLIFICATION, QUAL   POCT URINE PREGNANCY     X-Ray Chest 1 View   Final Result      Mild interstitial edema         Electronically signed by: Cecil Macias MD   Date:    08/11/2022   Time:    02:16          Treatments you had today:   Medications   midazolam (VERSED) 1 mg/mL injection 4 mg (0 mg Intravenous Hold 8/11/22 0045)   levETIRAcetam injection 2,000 mg (2,000 mg Intravenous Given 8/11/22 0038)   sodium chloride 0.9% bolus 1,000 mL (0 mLs Intravenous  Stopped 8/11/22 0209)       Follow-Up Plan:  - Follow-up with primary care doctor within 3 - 5 days  - Additional testing and/or evaluation as directed by your primary doctor    Return to the Emergency Department for symptoms including but not limited to: worsening symptoms, shortness of breath or chest pain, vomiting with inability to hold down fluids, fevers greater than 100.4°F, passing out/fainting/unconsciousness, or other concerning symptoms.

## 2022-08-12 NOTE — ED PROVIDER NOTES
Source of History:  Spouse  Chart  Patient    Chief complaint:  Seizures (Pt actively seizing upon arrival. Family with patient states this is the second seizure today. )      HPI:  Valerie Arizmendi is a 42 y.o. female with history of 1 prior seizure, marijuana use, presenting to emergency department with complaint of seizure.    Patient is accompanied by her spouse she states that she began seizing, he put her in the vehicle, and drove her here.  Prior to the seizure today, she was in her normal state of health.  She had not been complaining of fevers, vomiting, diarrhea, abdominal pain, headache, chest pain, shortness of breath.    Patient has bouts notes that after she was seen by Neurology 1 year ago following an provoked seizure, she had a normal EEG an outpatient MRI.  She was told that she would need to be started on seizure medication if she had any additional seizures.  She had 1 seizure the month after she saw Neurology, and patient and her spouse did not seek follow-up.  They state that they attributed to their physical living environment, and they moved, and the seizures did not recur.    Patient's  states that 1 week ago she had another seizure.  He again attributed to mold in air conditioning unit in the place where they reside.  They did not seek medical care.  Today patient had her 4th seizure.  She is not on any seizure medications.     witnesseds the entire seizure and denies any head trauma.  She is not anticoagulated.  No other complaints.    ROS: As per HPI and below:  Review of Systems   Constitutional: Negative for fever.   HENT: Negative for sore throat.    Eyes: Negative for double vision.   Respiratory: Negative for cough and shortness of breath.    Cardiovascular: Negative for chest pain.   Gastrointestinal: Negative for abdominal pain and vomiting.   Genitourinary: Negative for dysuria.   Musculoskeletal: Negative for falls.   Skin: Negative for rash.   Neurological:  Positive for seizures. Negative for headaches.       Review of patient's allergies indicates:  No Known Allergies    No current facility-administered medications on file prior to encounter.     No current outpatient medications on file prior to encounter.       PMH:  As per HPI and below:  Past Medical History:   Diagnosis Date    Abnormal Pap smear of vagina     colpo bx- cervixitis    Attention deficit disorder      Past Surgical History:   Procedure Laterality Date     SECTION         Social History     Socioeconomic History    Marital status:    Tobacco Use    Smoking status: Former Smoker    Smokeless tobacco: Never Used    Tobacco comment: quit    Substance and Sexual Activity    Alcohol use: Yes     Comment: occasionally    Drug use: Yes     Types: Marijuana    Sexual activity: Yes     Partners: Male       Family History   Problem Relation Age of Onset    No Known Problems Mother     No Known Problems Father     Breast cancer Paternal Grandmother     Breast cancer Maternal Aunt     Ovarian cancer Neg Hx        Physical Exam:      Vitals:    22 0300   BP: 105/67   Pulse: 71   Resp: 16   Temp:      (when no longer seizing)  Gen: No acute distress.  Nontoxic.  Well appearing.  Mental Status:  Somnolent.  Not following commands.  Protecting her airway.  Skin: Warm, dry. No rashes seen.  Eyes: PERRL. No conjunctival injection.  Pulm: CTAB. No increased work of breathing.  No significant tachypnea.  No audible stridor or wheezing.   CV: Regular rate. Regular rhythm.   Abd: Soft.  Not distended.  Nontender.    MSK: Good range of motion all joints.  No deformities.    Neuro:No focal neuro deficit observed.  No rigidity.    Laboratory Studies:  Labs Reviewed   CBC W/ AUTO DIFFERENTIAL - Abnormal; Notable for the following components:       Result Value    WBC 20.37 (*)     Gran # (ANC) 15.6 (*)     Immature Grans (Abs) 0.11 (*)     Gran % 76.9 (*)     Mono % 3.1 (*)     All  other components within normal limits   COMPREHENSIVE METABOLIC PANEL - Abnormal; Notable for the following components:    Potassium 3.3 (*)     CO2 13 (*)     Glucose 147 (*)     Anion Gap 23 (*)     All other components within normal limits   URINALYSIS, REFLEX TO URINE CULTURE - Abnormal; Notable for the following components:    Color, UA Colorless (*)     Ketones, UA Trace (*)     Occult Blood UA Trace (*)     All other components within normal limits    Narrative:     Specimen Source->Urine   DRUG SCREEN PANEL, URINE EMERGENCY - Abnormal; Notable for the following components:    THC Presumptive Positive (*)     All other components within normal limits    Narrative:     Specimen Source->Urine   POCT GLUCOSE MONITORING CONTINUOUS - Abnormal; Notable for the following components:    POC Glucose 148 (*)     All other components within normal limits   MAGNESIUM   TSH   SARS-COV-2 RNA AMPLIFICATION, QUAL   POCT URINE PREGNANCY       EKG (independently interpreted by me):  Sinus tachycardia, rate 103. No STEMI.  QRS 70 milliseconds.   milliseconds.    X-rays (independently interpreted by me):  No acute abnormality    Chart reviewed.     Imaging Results          X-Ray Chest 1 View (Final result)  Result time 08/11/22 02:16:56    Final result by Cecil Macias MD (08/11/22 02:16:56)                 Impression:      Mild interstitial edema      Electronically signed by: Cecil Macias MD  Date:    08/11/2022  Time:    02:16             Narrative:    EXAMINATION:  XR CHEST 1 VIEW    CLINICAL HISTORY:  Hypoxemia    TECHNIQUE:  Single frontal view of the chest was performed.    COMPARISON:  None    FINDINGS:  Mild interstitial edema.    No consolidation, pleural effusion or pneumothorax.    Cardiomediastinal silhouette is unremarkable.                                Medications Given:  Medications   levETIRAcetam injection 2,000 mg (2,000 mg Intravenous Given 8/11/22 0038)   sodium chloride 0.9% bolus 1,000 mL  (0 mLs Intravenous Stopped 8/11/22 0209)     MDM:    42 y.o. female with previous seizure worked up by Neurology presenting to emergency department with complaint of seizure.  She was actively seizing on arrival but seizure stopped prior to administration of any benzodiazepines.  Mild transient hypoxia which improved in the ED.  She received fluids and with Keppra loaded.  Normal blood glucose on arrival.    Her labs were reviewed.  Leukocytosis which is likely reactive in the setting of recent seizure.  No hyponatremia, or other significantelectrolyte disturbance.  Metabolic acidosis consistent with elevated lactate from recent seizure.  No acute kidney injury.  Urinalysis not consistent with infection.  UDS notable for marijuana.  Negative urine pregnancy test.  Normal blood glucose on arrival.    She was observed in the emergency department with gradual return to baseline mental status.  Tolerating p.o., with stable vitals, and able to ambulate independently.    Will start her on Keppra, and have her follow-up with her neurologist.  Advised not to operate heavy machinery or a motor vehicle, use a ladder, swim, or perform any other dangerous activity until cleared to do so by her neurologist.  Discharged home in stable condition.  Return precautions discussed at bedside.    Diagnostic Impression:    1. Seizure    2. Hypoxia         ED Disposition Condition    Discharge Stable        ED Prescriptions     Medication Sig Dispense Start Date End Date Auth. Provider    levETIRAcetam (KEPPRA) 500 MG Tab Take 1 tablet (500 mg total) by mouth 2 (two) times daily. 60 tablet 8/11/2022 9/10/2022 Jordyn Bourne MD        Follow-up Information     Follow up With Specialties Details Why Contact Info Additional Information    Jorge Swartz MD Internal Medicine Schedule an appointment as soon as possible for a visit   [facility] [address]  [address]  [place] [address]  [phone]       [facility] - Neurology [facility] Neu[facility]  Schedule an appointment as soon as possible for a visit   [id number]  [facility] [address] [phone] [facility] [place] - [facility], 7th Floor Please park in [facility] and take Clinic elevator          Patient and family understand the plan and is in agreement, verbalized understanding, questions answered    Jordyn Bourne MD  Emergency Medicine       Jordyn Bourne MD  08/12/22 1908

## 2022-08-24 ENCOUNTER — PATIENT MESSAGE (OUTPATIENT)
Dept: ADMINISTRATIVE | Facility: HOSPITAL | Age: 42
End: 2022-08-24
Payer: COMMERCIAL

## 2022-10-12 ENCOUNTER — PATIENT MESSAGE (OUTPATIENT)
Dept: ADMINISTRATIVE | Facility: HOSPITAL | Age: 42
End: 2022-10-12
Payer: COMMERCIAL

## 2022-11-04 RX ORDER — LEVETIRACETAM 500 MG/1
500 TABLET ORAL 2 TIMES DAILY
Qty: 180 TABLET | Refills: 2 | Status: SHIPPED | OUTPATIENT
Start: 2022-11-04 | End: 2023-08-01

## 2022-11-04 RX ORDER — LEVETIRACETAM 500 MG/1
500 TABLET ORAL 2 TIMES DAILY
Qty: 180 TABLET | Refills: 2 | Status: SHIPPED | OUTPATIENT
Start: 2022-11-04 | End: 2022-11-04 | Stop reason: SDUPTHER

## 2022-11-04 NOTE — TELEPHONE ENCOUNTER
Pt moved to [place]. Does not have insurance yet out there, or the financial means.     RX for Keppra pended below

## 2022-11-04 NOTE — TELEPHONE ENCOUNTER
----- Message from Farrah Andrews sent at 11/4/2022 11:18 AM CDT -----  Contact: Patient  Type:  RX Refill Request    Who Called: Patient  Refill or New Rx:refill  RX Name and Strength:levETIRAcetam (KEPPRA) 500 MG Tab  Preferred Pharmacy with phone number:[facility] ddress: [address] [place] [address]  Phone: [phone]  Would the patient rather a call back or a response via [facility]? Call back   Best Call Back Number:[phone]  Additional Information: Patient stated move to [place] and running out of medication     Patient stated has a week left of medication    Please assist

## 2023-04-03 ENCOUNTER — PATIENT MESSAGE (OUTPATIENT)
Dept: ADMINISTRATIVE | Facility: HOSPITAL | Age: 43
End: 2023-04-03
Payer: COMMERCIAL

## 2023-07-06 DIAGNOSIS — Z12.31 OTHER SCREENING MAMMOGRAM: ICD-10-CM
